# Patient Record
Sex: FEMALE | Race: WHITE
[De-identification: names, ages, dates, MRNs, and addresses within clinical notes are randomized per-mention and may not be internally consistent; named-entity substitution may affect disease eponyms.]

---

## 2019-04-26 ENCOUNTER — HOSPITAL ENCOUNTER (OUTPATIENT)
Dept: HOSPITAL 92 - BICMAMMO | Age: 54
Discharge: HOME | End: 2019-04-26
Attending: FAMILY MEDICINE
Payer: COMMERCIAL

## 2019-04-26 DIAGNOSIS — Z12.31: Primary | ICD-10-CM

## 2019-04-26 DIAGNOSIS — Z80.3: ICD-10-CM

## 2019-04-26 PROCEDURE — 77067 SCR MAMMO BI INCL CAD: CPT

## 2019-04-26 PROCEDURE — 77063 BREAST TOMOSYNTHESIS BI: CPT

## 2019-05-21 NOTE — MMO
Bilateral MAMMO Bilat Screen [facility].

 

CLINICAL HISTORY:

Patient is 54 years old and is seen for screening. The patient has the following

family history of breast cancer:  paternal grandmother.  The patient has no

personal history of cancer. The patient has a history of left Excisional Biopsy

in August, 2010 - benign - Lipoma removed.

 

VIEWS:

The views performed were:  bilateral craniocaudal with tomosynthesis and

bilateral mediolateral oblique with tomosynthesis.

 

FILMS COMPARED:

The present examination has been compared to prior imaging studies performed at

[facility] on 07/18/2012, 08/07/2013, 08/18/2014 and 11/30/2015.

 

MAMMOGRAM FINDINGS:

The breasts are heterogeneously dense, which could obscure a lesion on

mammography.

 

There are no suspicious masses, suspicious calcifications, or new areas of

architectural distortion.

 

IMPRESSION:

THERE IS NO MAMMOGRAPHIC EVIDENCE OF MALIGNANCY.

 

A ROUTINE FOLLOW-UP MAMMOGRAM IN 1 YEAR IS RECOMMENDED.

 

THE RESULTS OF THIS EXAM WERE SENT TO THE PATIENT.

 

ACR BI-RADS Category 1 - Negative

 

MAMMOGRAPHY NOTE:

 1. A negative mammogram report should not delay a biopsy if a dominant of

 clinically suspicious mass is present.

 2. Approximately 10% to 15% of breast cancers are not detected by

 mammography.

 3. Adenosis and dense breasts may obscure an underlying neoplasm.

## 2019-09-12 ENCOUNTER — HOSPITAL ENCOUNTER (OUTPATIENT)
Dept: HOSPITAL 92 - SDC | Age: 54
Discharge: HOME | End: 2019-09-12
Attending: OTOLARYNGOLOGY
Payer: COMMERCIAL

## 2019-09-12 VITALS — BODY MASS INDEX: 31.9 KG/M2

## 2019-09-12 DIAGNOSIS — Z79.899: ICD-10-CM

## 2019-09-12 DIAGNOSIS — E78.5: ICD-10-CM

## 2019-09-12 DIAGNOSIS — E66.9: ICD-10-CM

## 2019-09-12 DIAGNOSIS — Z88.8: ICD-10-CM

## 2019-09-12 DIAGNOSIS — J34.0: ICD-10-CM

## 2019-09-12 DIAGNOSIS — Z88.5: ICD-10-CM

## 2019-09-12 DIAGNOSIS — J34.2: Primary | ICD-10-CM

## 2019-09-12 DIAGNOSIS — J34.3: ICD-10-CM

## 2019-09-12 PROCEDURE — 85014 HEMATOCRIT: CPT

## 2019-09-12 PROCEDURE — 09TL8ZZ RESECTION OF NASAL TURBINATE, VIA NATURAL OR ARTIFICIAL OPENING ENDOSCOPIC: ICD-10-PCS | Performed by: OTOLARYNGOLOGY

## 2019-09-12 PROCEDURE — 099M8ZZ DRAINAGE OF NASAL SEPTUM, VIA NATURAL OR ARTIFICIAL OPENING ENDOSCOPIC: ICD-10-PCS | Performed by: OTOLARYNGOLOGY

## 2019-09-12 PROCEDURE — 36415 COLL VENOUS BLD VENIPUNCTURE: CPT

## 2019-09-12 PROCEDURE — 93010 ELECTROCARDIOGRAM REPORT: CPT

## 2019-09-12 PROCEDURE — 93005 ELECTROCARDIOGRAM TRACING: CPT

## 2019-09-12 NOTE — EKG
Test Reason : PREOP

Blood Pressure : ***/*** mmHG

Vent. Rate : 068 BPM     Atrial Rate : 068 BPM

   P-R Int : 132 ms          QRS Dur : 086 ms

    QT Int : 418 ms       P-R-T Axes : 017 029 030 degrees

   QTc Int : 444 ms

 

Normal sinus rhythm

Normal ECG

 

Confirmed by DR. ALYSHA CLOUD ([id number]) on 9/12/2019 5:06:41 PM

 

Referred By:  ANTONETTE           Confirmed By:DR. ALYSHA CLOUD

## 2019-09-13 NOTE — OP
DATE OF PROCEDURE:  09/12/2019



PREOPERATIVE DIAGNOSES:  Profound septal deformity, hypertrophic inferior turbinates.



POSTOPERATIVE DIAGNOSES:  Profound septal deformity, hypertrophic inferior

turbinates. 



PROCEDURES PERFORMED:  

1. Septoplasty.

2. Bilateral nasal endoscopy with submucosal resection of inferior turbinates.



PROCEDURE IN DETAIL:  SEPTOPLASTY: 



After local anesthesia was infiltrated into the submucoperichondrial plane, a

standard Jordan incision was made with a #15 blade down to the level of the septal

cartilage.  The caudal elevator was used to elevate the mucoperichondrium from the

underlying cartilage.  We then proceeded beyond the bony cartilaginous junction and

elevated the bony periosteum as well.  Great attention was paid to the spur to

prevent rent formation in the septal flap. A transcartilaginous incision was then

made, while preserving an adequate dorsal and caudal cartilaginous strut for tip

support.  The deformed cartilage was removed and disarticulated from the bony

cartilaginous junction and maxillary crest.  This was placed in saline and would

later be crushed and returned to the mucoperichondrial envelope.  We then elevated

the contralateral periosteum from the bony cartilaginous region and removed the

deformed portions of the bone and bony spurs.  The cartilage was then crushed and

placed back into the mucoperichondrial envelope and the mucosa was re-approximated

with a quilting stitch composed of rapidly absorbent gut suture.  The Jordan

incision was also closed with interrupted gut suture.  At the completion of the

case, Schuler splints were placed and suture secured to the caudal septum. 



BILATERAL NASAL ENDOSCOPY WITH SUBMUCOSAL RESECTION OF INFERIOR TURBINATES: 

After consent was obtained, the patient was identified, brought to the operating

room, and placed on the operating room table in the supine position.  Consent was

obtained, notifying the patient of the possibility of additional infections,

bleeding, brain injury, and eye/orbital injury.   The patient was placed on the

operating room table, and general endotracheal anesthesia and intravenous access was

obtained.  The patient was then positioned, prepped and draped for endoscopic sinus

surgery.  Nasal preparation included trimming nasal vestibular hairs and spraying in

topical Afrin.  We then placed Afrin topical solution on nasal pledgets and

strategically located them intranasally.  The perinasal mucosa was injected with 1%

lidocaine with 1:100,000 epinephrine in the submucoperichondrial plane of the

septum, lateral nasal wall, and anterior to the uncinate.  The patient was then

prepped and draped in a sterile fashion and positioned for endoscopic sinus surgery. 



With the 0-degree endoscope, the patient underwent systematic nasal endoscopy.

There were no suspicious internasal masses or lesions identified.  We then focused

our attention to the osteomeatal complex region under the middle turbinate. 



The inferior turbinates were visualized with a 0 degree endoscope and outfractured

with a Mateo elevator.  The inferior medial aspect was cauterized with the

electrocautery.  Hemostasis was obtained .  After adequate airway was established,

we turned our attention to the contralateral side and used a similar procedure.

Again, a Richfield elevator was used to outfracture inferior turbinates under endoscopic

visualization.  With a suction cautery, the free inferior medial aspect was

cauterized under direct visualization along the length of the inferior turbinate. 



At this point, we then turned our attention to the contralateral side and proceeded

with endoscopic sinus surgery. 



At the completion of the case, Rice keel splints were placed in the ethmoid cavities

after the ethmoidectomy.  There were no complications.  The patient tolerated the

procedure well and was discharged to the recovery room in stable condition prior to

return to the preoperative day stay with ultimate discharge home.  Prescriptions for

pain medication and antibiotics were provided.  The patient received intramuscular

Depo-Medrol during the case. 







Job ID:  [id number]

## 2019-11-07 ENCOUNTER — HOSPITAL ENCOUNTER (OUTPATIENT)
Dept: HOSPITAL 92 - SDC | Age: 54
Discharge: HOME | End: 2019-11-07
Attending: OTOLARYNGOLOGY
Payer: COMMERCIAL

## 2019-11-07 VITALS — BODY MASS INDEX: 30.7 KG/M2

## 2019-11-07 DIAGNOSIS — J34.3: ICD-10-CM

## 2019-11-07 DIAGNOSIS — J34.2: ICD-10-CM

## 2019-11-07 DIAGNOSIS — J34.89: ICD-10-CM

## 2019-11-07 DIAGNOSIS — E78.5: ICD-10-CM

## 2019-11-07 DIAGNOSIS — Z88.5: ICD-10-CM

## 2019-11-07 DIAGNOSIS — Z88.8: ICD-10-CM

## 2019-11-07 DIAGNOSIS — J32.4: Primary | ICD-10-CM

## 2019-11-07 LAB
ANION GAP SERPL CALC-SCNC: 15 MMOL/L (ref 10–20)
BUN SERPL-MCNC: 14 MG/DL (ref 9.8–20.1)
CALCIUM SERPL-MCNC: 10 MG/DL (ref 7.8–10.44)
CHLORIDE SERPL-SCNC: 105 MMOL/L (ref 98–107)
CO2 SERPL-SCNC: 27 MMOL/L (ref 22–29)
CREAT CL PREDICTED SERPL C-G-VRATE: 105 ML/MIN (ref 70–130)
GLUCOSE SERPL-MCNC: 80 MG/DL (ref 70–105)
HGB BLD-MCNC: 14.4 G/DL (ref 12–16)
POTASSIUM SERPL-SCNC: 3.9 MMOL/L (ref 3.5–5.1)
SODIUM SERPL-SCNC: 143 MMOL/L (ref 136–145)

## 2019-11-07 PROCEDURE — 09BR8ZZ EXCISION OF LEFT MAXILLARY SINUS, VIA NATURAL OR ARTIFICIAL OPENING ENDOSCOPIC: ICD-10-PCS | Performed by: OTOLARYNGOLOGY

## 2019-11-07 PROCEDURE — 09QM0ZZ REPAIR NASAL SEPTUM, OPEN APPROACH: ICD-10-PCS | Performed by: OTOLARYNGOLOGY

## 2019-11-07 PROCEDURE — 09TU8ZZ RESECTION OF RIGHT ETHMOID SINUS, VIA NATURAL OR ARTIFICIAL OPENING ENDOSCOPIC: ICD-10-PCS | Performed by: OTOLARYNGOLOGY

## 2019-11-07 PROCEDURE — 85014 HEMATOCRIT: CPT

## 2019-11-07 PROCEDURE — 09TL8ZZ RESECTION OF NASAL TURBINATE, VIA NATURAL OR ARTIFICIAL OPENING ENDOSCOPIC: ICD-10-PCS | Performed by: OTOLARYNGOLOGY

## 2019-11-07 PROCEDURE — 09BQ8ZZ EXCISION OF RIGHT MAXILLARY SINUS, VIA NATURAL OR ARTIFICIAL OPENING ENDOSCOPIC: ICD-10-PCS | Performed by: OTOLARYNGOLOGY

## 2019-11-07 PROCEDURE — 09SM0ZZ REPOSITION NASAL SEPTUM, OPEN APPROACH: ICD-10-PCS | Performed by: OTOLARYNGOLOGY

## 2019-11-07 PROCEDURE — 09TV8ZZ RESECTION OF LEFT ETHMOID SINUS, VIA NATURAL OR ARTIFICIAL OPENING ENDOSCOPIC: ICD-10-PCS | Performed by: OTOLARYNGOLOGY

## 2019-11-07 PROCEDURE — 80048 BASIC METABOLIC PNL TOTAL CA: CPT

## 2019-11-07 PROCEDURE — 85018 HEMOGLOBIN: CPT

## 2019-11-07 PROCEDURE — 099T8ZZ DRAINAGE OF LEFT FRONTAL SINUS, VIA NATURAL OR ARTIFICIAL OPENING ENDOSCOPIC: ICD-10-PCS | Performed by: OTOLARYNGOLOGY

## 2019-11-07 PROCEDURE — 099S8ZZ DRAINAGE OF RIGHT FRONTAL SINUS, VIA NATURAL OR ARTIFICIAL OPENING ENDOSCOPIC: ICD-10-PCS | Performed by: OTOLARYNGOLOGY

## 2019-11-08 NOTE — OP
DATE OF PROCEDURE:  11/07/2019



PREOPERATIVE DIAGNOSES:  

1. Deviated septum.

2. Chronic sinusitis, facial pain, hypertrophic inferior turbinates, and nasal valve

collapse. 



POSTOPERATIVE DIAGNOSES:  

1. Deviated septum.

2. Chronic sinusitis, facial pain, hypertrophic inferior turbinates, and nasal valve

collapse. 



PROCEDURES PERFORMED:  

1. Septoplasty.

2. Bilateral nasal endoscopy with maxillary antrostomy with removal of tissue.

3. Bilateral nasal endoscopy with total ethmoidectomy.

4. Bilateral nasal endoscopy with frontal sinusotomy.

5. Bilateral nasal endoscopy with submucosal resection of inferior turbinates.

6. Nasal valve repair utilizing LATERA implant system.



DESCRIPTION OF PROCEDURE:  SEPTOPLASTY: 

After local anesthesia was infiltrated into the submucoperichondrial plane, a

standard [place]la incision was made with a #15 blade down to the level of the septal

cartilage.  The caudal elevator was used to elevate the mucoperichondrium from the

underlying cartilage.  We then proceeded beyond the bony cartilaginous junction and

elevated the bony periosteum as well.  Great attention was paid to the spur to

prevent rent formation in the septal flap. A transcartilaginous incision was then

made, while preserving an adequate dorsal and caudal cartilaginous strut for tip

support.  The deformed cartilage was removed and disarticulated from the bony

cartilaginous junction and maxillary crest.  This was placed in saline and would

later be crushed and returned to the mucoperichondrial envelope.  We then elevated

the contralateral periosteum from the bony cartilaginous region and removed the

deformed portions of the bone and bony spurs.  The cartilage was then crushed and

placed back into the mucoperichondrial envelope and the mucosa was re-approximated

with a quilting stitch composed of rapidly absorbent gut suture.  The La Carla

incision was also closed with interrupted gut suture.  At the completion of the

case, Schuler splints were placed and suture secured to the caudal septum. 



BILATERAL NASAL ENDOSCOPY WITH MAXILLARY ANTROSTOMY WITH REMOVAL OF TISSUE: 

The uncinate was then identified and the extent of the uncinate was appreciated by

out-fracturing the uncinate with the ball-tip probe.  We then used the sickle blade

to disarticulate the uncinate from the lateral nasal wall.  This was then removed

with straight biting and upbiting punches with the remaining shrouds of mucosa and

bony septum removed with the micro-debrider.  The natural os of the maxillary sinus

was then identified and enlarged with the maxillary punches and back biting forceps. 



BILATERAL NASAL ENDOSCOPY WITH TOTAL ETHMOIDECTOMY: 

The anterior face of the ethmoid bulla was entered and with the micro-debrider,

dissection continued posteriorly to the ground lamella.  The limits of dissection

included the insertion of the middle turbinate, medial orbital wall, and base of

skull.  We similarly identified the frontal recess and removed shrouds of bone and

debris in that region to obtain patency into the agger nasi region and frontal

recess.  We then entered the ground lamella and its anteroinferior aspect and

proceeded posteriorly, opening the posterior ethmoid air-cell system.  Again, the

limits of dissection included the base of skull and medial orbital wall. 



BILATERAL NASAL ENDOSCOPY WITH FRONTAL SINUSOTOMY: 

Following the ethmoidectomy, we then turned our attention to the frontal nasal

recess. The agger nasi cells were addressed and the frontal recess was exposed. The

natural opening to the frontal sinus was identified. At this point, any obstructing

shrouds of mucosa and bony fragments were removed with a curved microdebrider. The

wound was then examined and found to be free of any obstructing debris. We then

turned our attention to the contralateral side and performed a similar procedure

again under endoscopic visualization using a 45-degree scope. We were able to

visualize the frontal recess. Obstructing shrouds of mucosa and bone were removed

with a microdebrider. The natural os of frontal sinus was identified and enlarged

and irrigated.   At this point, the frontal sinusotomy was completed and we turned

to the next area of concern. 



BILATERAL NASAL ENDOSCOPY WITH SUBMUCOSAL RESECTION OF INFERIOR TURBINATES: 

After consent was obtained, the patient was identified, brought to the operating

room, and placed on the operating room table in the supine position.  Consent was

obtained, notifying the patient of the possibility of additional infections,

bleeding, brain injury, and eye/orbital injury.   The patient was placed on the

operating room table, and general endotracheal anesthesia and intravenous access was

obtained.  The patient was then positioned, prepped and draped for endoscopic sinus

surgery.  Nasal preparation included trimming nasal vestibular hairs and spraying in

topical Afrin.  We then placed Afrin topical solution on nasal pledgets and

strategically located them intranasally.  The perinasal mucosa was injected with 1%

lidocaine with 1:100,000 epinephrine in the submucoperichondrial plane of the

septum, lateral nasal wall, and anterior to the uncinate.  The patient was then

prepped and draped in a sterile fashion and positioned for endoscopic sinus surgery. 



With the 0-degree endoscope, the patient underwent systematic nasal endoscopy.

There were no suspicious internasal masses or lesions identified.  We then focused

our attention to the osteomeatal complex region under the middle turbinate. 



The inferior turbinates were visualized with a 0 degree endoscope and outfractured

with a Mazama elevator.  The inferior medial aspect was cauterized with the

electrocautery.  Hemostasis was obtained .  After adequate airway was established,

we turned our attention to the contralateral side and used a similar procedure.

Again, a Mazama elevator was used to outfracture inferior turbinates under endoscopic

visualization.  With a suction cautery, the free inferior medial aspect was

cauterized under direct visualization along the length of the inferior turbinate. 



At this point, we then turned our attention to the contralateral side and proceeded

with endoscopic sinus surgery. 



At the completion of the case, Rice keel splints were placed in the ethmoid cavities

after the ethmoidectomy.  There were no complications.  The patient tolerated the

procedure well and was discharged to the recovery room in stable condition prior to

return to the preoperative day stay with ultimate discharge home.  Prescriptions for

pain medication and antibiotics were provided.  The patient received intramuscular

Depo-Medrol during the case. 



NASAL VALVE REPAIR UTILIZING LATERA IMPLANT SYSTEM: 

Following the sinus procedure, we proceeded with nasal valve reconstruction.  The

topical anatomy was delineated using the guide and the marking pen was used to tamara

places for intended placement.  The point of insertion was also delineated.  The

area was infiltrated with 1% lidocaine with 1:100,000 epinephrine.  We then grasped

the double-edged skin probe and rolled out the nasal skin, which then allowed for

entry of the implant device.  The implant was placed in the area along the lateral

nasal wall, and then delivered there in standard fashion.  We then turned our

attention to the contralateral side and used identical technique.  Excellent nasal

valve support was established with good aesthetic results.  The patient was then

awakened, extubated, and taken to the recovery room in stable condition. 







Job ID:  [id number]

## 2020-06-25 ENCOUNTER — HOSPITAL ENCOUNTER (OUTPATIENT)
Dept: HOSPITAL 92 - BICMAMMO | Age: 55
Discharge: HOME | End: 2020-06-25
Attending: FAMILY MEDICINE
Payer: COMMERCIAL

## 2020-06-25 DIAGNOSIS — Z80.3: ICD-10-CM

## 2020-06-25 DIAGNOSIS — Z12.31: Primary | ICD-10-CM

## 2020-06-25 DIAGNOSIS — Z91.89: ICD-10-CM

## 2020-06-25 PROCEDURE — 77063 BREAST TOMOSYNTHESIS BI: CPT

## 2020-06-25 PROCEDURE — 77067 SCR MAMMO BI INCL CAD: CPT

## 2020-06-25 NOTE — MMO
Bilateral MAMMO Bilat Screen [facility].

 

CLINICAL HISTORY:

Patient is 55 years old and is seen for screening. The patient has the following

family history of breast cancer:  paternal grandmother.  The patient has no

personal history of cancer. The patient has a history of left Excisional Biopsy

in August, 2010 - benign - Lipoma removed.

 

VIEWS:

The views performed were:  bilateral craniocaudal with tomosynthesis and

bilateral mediolateral oblique with tomosynthesis.

 

FILMS COMPARED:

The present examination has been compared to prior imaging studies performed at

[facility] on 08/07/2013, 08/18/2014, 11/30/2015 and 04/26/2019.

 

This study has been interpreted with the assistance of computer-aided detection.

 

MAMMOGRAM FINDINGS:

The breasts are heterogeneously dense, which could obscure a lesion on

mammography.

 

Finding 1:  There are stable benign appearing calcifications seen in both

breasts.

 

Finding 2:  There are stable benign appearing densities seen in both breasts.

 

There are no suspicious masses, suspicious calcifications, or new areas of

architectural distortion.

 

IMPRESSION:

THERE IS NO MAMMOGRAPHIC EVIDENCE OF MALIGNANCY.

 

A ROUTINE FOLLOW-UP MAMMOGRAM IN 1 YEAR IS RECOMMENDED.

 

THE RESULTS OF THIS EXAM WERE SENT TO THE PATIENT.

 

ACR BI-RADS Category 2 - Benign finding

 

MAMMOGRAPHY NOTE:

 1. A negative mammogram report should not delay a biopsy if a dominant of

 clinically suspicious mass is present.

 2. Approximately 10% to 15% of breast cancers are not detected by

 mammography.

 3. Adenosis and dense breasts may obscure an underlying neoplasm.

 

 

Reported by: CAMI ZAMORA MD

Electonically Signed: [id number]